# Patient Record
Sex: MALE | Race: WHITE
[De-identification: names, ages, dates, MRNs, and addresses within clinical notes are randomized per-mention and may not be internally consistent; named-entity substitution may affect disease eponyms.]

---

## 2019-01-30 ENCOUNTER — HOSPITAL ENCOUNTER (OUTPATIENT)
Dept: HOSPITAL 19 - ZCOL.LAB | Age: 79
End: 2019-01-30
Attending: ORTHOPAEDIC SURGERY
Payer: MEDICARE

## 2019-01-30 DIAGNOSIS — Z01.812: Primary | ICD-10-CM

## 2019-01-30 DIAGNOSIS — Z86.14: ICD-10-CM

## 2019-03-22 ENCOUNTER — HOSPITAL ENCOUNTER (OUTPATIENT)
Dept: HOSPITAL 19 - SDCO | Age: 79
Discharge: HOME | End: 2019-03-22
Attending: UROLOGY
Payer: MEDICARE

## 2019-03-22 VITALS — SYSTOLIC BLOOD PRESSURE: 155 MMHG | HEART RATE: 66 BPM | DIASTOLIC BLOOD PRESSURE: 80 MMHG

## 2019-03-22 VITALS — WEIGHT: 201.72 LBS | HEIGHT: 66 IN | BODY MASS INDEX: 32.42 KG/M2

## 2019-03-22 VITALS — DIASTOLIC BLOOD PRESSURE: 81 MMHG | SYSTOLIC BLOOD PRESSURE: 153 MMHG | HEART RATE: 71 BPM

## 2019-03-22 VITALS — DIASTOLIC BLOOD PRESSURE: 80 MMHG | TEMPERATURE: 98 F | HEART RATE: 66 BPM | SYSTOLIC BLOOD PRESSURE: 155 MMHG

## 2019-03-22 VITALS — DIASTOLIC BLOOD PRESSURE: 87 MMHG | SYSTOLIC BLOOD PRESSURE: 144 MMHG | HEART RATE: 82 BPM

## 2019-03-22 VITALS — TEMPERATURE: 97.4 F | HEART RATE: 72 BPM | DIASTOLIC BLOOD PRESSURE: 79 MMHG | SYSTOLIC BLOOD PRESSURE: 153 MMHG

## 2019-03-22 DIAGNOSIS — Z96.651: ICD-10-CM

## 2019-03-22 DIAGNOSIS — N20.1: Primary | ICD-10-CM

## 2019-03-22 DIAGNOSIS — Z79.899: ICD-10-CM

## 2019-03-22 PROCEDURE — C1769 GUIDE WIRE: HCPCS

## 2019-03-22 NOTE — NUR
TO  5 PER CART FROM PACU. UPON RETURNING TO  PATIENT ASKING TO GO HOME.
UP AMBULATED TO BATHROOM. VOIDED AND TOLERATED WELL.
DENIES PAIN OR DISCOMFORT
DENIES NAUSEA OR VOMITING.

## 2019-03-22 NOTE — NUR
RECEIVED DISCHARGE INSTRUCTIONS AND VERBALIZED UNDERSTANDING.
DISCONTINUED IV AND INT- CATHETER INTACT